# Patient Record
Sex: FEMALE | Race: WHITE | NOT HISPANIC OR LATINO | Employment: UNEMPLOYED | URBAN - METROPOLITAN AREA
[De-identification: names, ages, dates, MRNs, and addresses within clinical notes are randomized per-mention and may not be internally consistent; named-entity substitution may affect disease eponyms.]

---

## 2017-01-24 ENCOUNTER — LAB VISIT (OUTPATIENT)
Dept: LAB | Facility: HOSPITAL | Age: 37
End: 2017-01-24
Attending: INTERNAL MEDICINE
Payer: COMMERCIAL

## 2017-01-24 ENCOUNTER — OFFICE VISIT (OUTPATIENT)
Dept: CARDIOLOGY | Facility: CLINIC | Age: 37
End: 2017-01-24
Payer: COMMERCIAL

## 2017-01-24 VITALS
SYSTOLIC BLOOD PRESSURE: 120 MMHG | HEIGHT: 65 IN | DIASTOLIC BLOOD PRESSURE: 83 MMHG | HEART RATE: 69 BPM | WEIGHT: 168 LBS | BODY MASS INDEX: 27.99 KG/M2

## 2017-01-24 DIAGNOSIS — R07.89 CHEST TIGHTNESS: ICD-10-CM

## 2017-01-24 DIAGNOSIS — R94.31 ABNORMAL RESTING ECG FINDINGS: ICD-10-CM

## 2017-01-24 DIAGNOSIS — R00.2 PALPITATIONS: ICD-10-CM

## 2017-01-24 LAB — TSH SERPL DL<=0.005 MIU/L-ACNC: 0.76 UIU/ML

## 2017-01-24 PROCEDURE — 99204 OFFICE O/P NEW MOD 45 MIN: CPT | Mod: S$GLB,,, | Performed by: INTERNAL MEDICINE

## 2017-01-24 PROCEDURE — 84443 ASSAY THYROID STIM HORMONE: CPT

## 2017-01-24 PROCEDURE — 1159F MED LIST DOCD IN RCRD: CPT | Mod: S$GLB,,, | Performed by: INTERNAL MEDICINE

## 2017-01-24 PROCEDURE — 99999 PR PBB SHADOW E&M-NEW PATIENT-LVL III: CPT | Mod: PBBFAC,,, | Performed by: INTERNAL MEDICINE

## 2017-01-24 PROCEDURE — 36415 COLL VENOUS BLD VENIPUNCTURE: CPT | Mod: PO

## 2017-01-24 NOTE — MR AVS SNAPSHOT
"    [facility]  [address] [facility] [address]  [place] [address]  Phone: [phone]                  Niesha Victoria   2017 3:00 PM   Office Visit    Description:  Female : 1980   Provider:  Kirt Whitehead MD   Department:  [facility]           Reason for Visit     Chest Pain           Diagnoses this Visit        Comments    Chest tightness         Palpitations         Abnormal resting ECG findings                To Do List           Goals (5 Years of Data)     None      Follow-Up and Disposition     Call patient with results    Follow-up and Disposition History      [facility]     [facility] Nurse Care Line -  Assistance  Registered nurses in the [facility] provide clinical advisement, health education, appointment booking, and other advisory services.  Call for this free service at [phone].             Medications           Message regarding Medications     Verify the changes and/or additions to your medication regime listed below are the same as discussed with your clinician today.  If any of these changes or additions are incorrect, please notify your healthcare provider.        STOP taking these medications     doxycycline (VIBRAMYCIN) 50 MG capsule Take 75 mg by mouth once daily.            Verify that the below list of medications is an accurate representation of the medications you are currently taking.  If none reported, the list may be blank. If incorrect, please contact your healthcare provider. Carry this list with you in case of emergency.           Current Medications     dapsone 100 MG Tab Take 25 mg by mouth every other day.           Clinical Reference Information           Your Vitals Were     BP Pulse Height Weight BMI    120/83 (BP Location: Right arm, Patient Position: Sitting, BP Method: Automatic) 69 5' 5" (1.651 m) 76.2 kg (167 lb 15.9 oz) 27.96 kg/m2      Blood Pressure          Most Recent Value    BP  120/83      Allergies " as of 1/24/2017     No Known Allergies      Immunizations Administered on Date of Encounter - 1/24/2017     None      Orders Placed During Today's Visit     Future Labs/Procedures Expected by Expires    TSH  1/24/2017 3/25/2018    Exercise stress echo  As directed 1/24/2018      Language Assistance Services     ATTENTION: Language assistance services are available, free of charge. Please call [phone].      ATENCIÓN: Si habla español, tiene a kunz disposición servicios gratuitos de asistencia lingüística. Llame al [phone].     CHÚ Ý: N?u b?n nói Ti?ng Vi?t, có các d?ch v? h? tr? ngôn ng? mi?n phí dành cho b?n. G?i s? [phone].         [facility] complies with applicable Federal civil rights laws and does not discriminate on the basis of race, color, national origin, age, disability, or sex.

## 2017-01-24 NOTE — PROGRESS NOTES
Subjective:    Patient ID:  Niesha Victoria is a 36 y.o. female who presents for evaluation of Chest Pain (chest tightness since Sunday (painscale 5/10) - Went to [facility] ER 01/23/16)      HPI Comments: Pt with no chronic medical problems began feeling a bit jittery, anxious and developed a tightness in her chest which has persisted for 2 days. She feels like her heart is racing but BP and pulse are normal. She was seen in the ER yesterday and other than some non-specific ECG findings w/u was normal. She reports no exertional component to the symptoms and denies dyspnea. She has not been able to sleep and is fearful os a major heart problem.       Review of Systems   Constitution: Negative for weight gain and weight loss.   HENT: Negative.    Eyes: Negative.    Cardiovascular: Positive for chest pain and palpitations. Negative for claudication, cyanosis, dyspnea on exertion, irregular heartbeat, leg swelling, near-syncope, orthopnea (no PND) and syncope.   Respiratory: Negative for cough, hemoptysis, shortness of breath and snoring.    Endocrine: Negative.    Skin: Negative.    Musculoskeletal: Negative for joint pain, muscle cramps, muscle weakness and myalgias.   Gastrointestinal: Negative for diarrhea, hematemesis, nausea and vomiting.   Genitourinary: Negative.    Neurological: Negative for dizziness, focal weakness, light-headedness, loss of balance, numbness, paresthesias and seizures.   Psychiatric/Behavioral: The patient has insomnia and is nervous/anxious.         Objective:    Physical Exam   Constitutional: She is oriented to person, place, and time. She appears well-developed and well-nourished.   Eyes: Pupils are equal, round, and reactive to light.   Neck: Normal range of motion. No thyromegaly present.   Cardiovascular: Normal rate, regular rhythm, S1 normal, S2 normal, normal heart sounds, intact distal pulses and normal pulses.   No extrasystoles are present. PMI is not displaced.  Exam reveals no  friction rub.    No murmur heard.  Pulmonary/Chest: Effort normal and breath sounds normal. She has no wheezes. She has no rales. She exhibits no tenderness.   Abdominal: Soft. Bowel sounds are normal. She exhibits no distension and no mass. There is no tenderness.   Musculoskeletal: Normal range of motion. She exhibits no edema.   Neurological: She is alert and oriented to person, place, and time.   Skin: Skin is warm and dry.   Vitals reviewed.      Test(s) Reviewed  I have reviewed the following in detail:  [] Stress test   [] Angiography   [] Echocardiogram   [x] Labs   [x] Other:  ECG, CXR       Assessment:       1. Chest tightness    2. Palpitations    3. Abnormal resting ECG findings         Plan:       We discussed her symptoms and concerns  Will do exercise stress echo - if normal she will f/u with PCP  TSH

## 2017-01-24 NOTE — LETTER
January 24, 2017      [facility]  [address] [facility] [address]           [facility]  [address] [facility] [address] [place] [address]  Phone: [phone]          Patient: Niesha Victoria   MR Number: [id number]   YOB: 1980   Date of Visit: 1/24/2017       Dear [facility]:    Thank you for referring Niesha Victoria to me for evaluation. Attached you will find relevant portions of my assessment and plan of care.    If you have questions, please do not hesitate to call me. I look forward to following Niesha Victoria along with you.    Sincerely,    Kirt Whitehead MD    Enclosure  CC:  No Recipients    If you would like to receive this communication electronically, please contact [email or web address] or [phone] to request more information on [employer] Link access.    For providers and/or their staff who would like to refer a patient to [facility], please contact us through our one-stop-shop provider referral line, Lewis Lyons, at [phone].    If you feel you have received this communication in error or would no longer like to receive these types of communications, please e-mail [email or web address]

## 2017-03-08 ENCOUNTER — OFFICE VISIT (OUTPATIENT)
Dept: CARDIOLOGY | Facility: CLINIC | Age: 37
End: 2017-03-08
Payer: COMMERCIAL

## 2017-03-08 VITALS
HEART RATE: 72 BPM | DIASTOLIC BLOOD PRESSURE: 71 MMHG | HEIGHT: 65 IN | WEIGHT: 159.38 LBS | SYSTOLIC BLOOD PRESSURE: 112 MMHG | BODY MASS INDEX: 26.55 KG/M2

## 2017-03-08 DIAGNOSIS — F41.9 ANXIETY: ICD-10-CM

## 2017-03-08 DIAGNOSIS — R07.9 CHEST PAIN, UNSPECIFIED TYPE: ICD-10-CM

## 2017-03-08 DIAGNOSIS — R00.2 PALPITATIONS: ICD-10-CM

## 2017-03-08 PROCEDURE — 1160F RVW MEDS BY RX/DR IN RCRD: CPT | Mod: S$GLB,,, | Performed by: INTERNAL MEDICINE

## 2017-03-08 PROCEDURE — 99999 PR PBB SHADOW E&M-EST. PATIENT-LVL III: CPT | Mod: PBBFAC,,, | Performed by: INTERNAL MEDICINE

## 2017-03-08 PROCEDURE — 99214 OFFICE O/P EST MOD 30 MIN: CPT | Mod: S$GLB,,, | Performed by: INTERNAL MEDICINE

## 2017-03-08 RX ORDER — DESONIDE 0.5 MG/G
1 AEROSOL, FOAM TOPICAL
Refills: 4 | COMMUNITY
Start: 2017-02-14 | End: 2020-08-12

## 2017-03-08 RX ORDER — ALPRAZOLAM 0.5 MG/1
0.5 TABLET ORAL DAILY PRN
Refills: 0 | COMMUNITY
Start: 2017-01-25 | End: 2020-08-12

## 2017-03-08 RX ORDER — TEMAZEPAM 15 MG/1
15 CAPSULE ORAL NIGHTLY PRN
Refills: 0 | COMMUNITY
Start: 2017-02-15 | End: 2020-08-12

## 2017-03-08 NOTE — MR AVS SNAPSHOT
[facility]  [address]  [place] [address]  Phone: [phone]                  Niesha Victoria   3/8/2017 9:40 AM   Office Visit    Description:  Female : 1980   Provider:  Kirt Whitehead MD   Department:  [facility]           Reason for Visit     Follow-up     Palpitations     Results           Diagnoses this Visit        Comments    Palpitations         Anxiety         Chest pain, unspecified type                To Do List           Future Appointments        Provider Department Dept Phone    3/13/2017 8:30 AM EKG, [facility] [phone]    3/15/2017 9:15 AM ECHO, [facility] [phone]      Goals (5 Years of Data)     None      Follow-Up and Disposition     Call patient with results      [facility]     [facility] On Call Nurse Care Line -  Assistance  Registered nurses in the [facility] provide clinical advisement, health education, appointment booking, and other advisory services.  Call for this free service at [phone].             Medications           Message regarding Medications     Verify the changes and/or additions to your medication regime listed below are the same as discussed with your clinician today.  If any of these changes or additions are incorrect, please notify your healthcare provider.        STOP taking these medications     dapsone 100 MG Tab Take 25 mg by mouth every other day.           Verify that the below list of medications is an accurate representation of the medications you are currently taking.  If none reported, the list may be blank. If incorrect, please contact your healthcare provider. Carry this list with you in case of emergency.           Current Medications     alprazolam (XANAX) 0.5 MG tablet Take 0.5 mg by mouth daily as needed.    OMEGA-3 FATTY ACIDS/FISH OIL (OMEGA 3 FISH OIL ORAL) Take 2 capsules by mouth once daily.    temazepam (RESTORIL) 15 mg Cap  "Take 15 mg by mouth nightly as needed.    VERDESO 0.05 % foam Apply 1 application topically 3 (three) times a week.           Clinical Reference Information           Your Vitals Were     BP Pulse Height Weight Last Period BMI    112/71 (BP Location: Right arm, Patient Position: Sitting, BP Method: Automatic) 72 5' 5" (1.651 m) 72.3 kg (159 lb 6.3 oz) 02/12/2017 (Approximate) 26.52 kg/m2      Blood Pressure          Most Recent Value    BP  112/71      Allergies as of 3/8/2017     No Known Allergies      Immunizations Administered on Date of Encounter - 3/8/2017     None      Orders Placed During Today's Visit     Future Labs/Procedures Expected by Expires    Exercise stress echo  As directed 3/8/2018    Holter monitor - 48 hour  As directed 3/8/2018      Language Assistance Services     ATTENTION: Language assistance services are available, free of charge. Please call [phone].      ATENCIÓN: Si habla español, tiene a kunz disposición servicios gratuitos de asistencia lingüística. Llame al [phone].     CHÚ Ý: N?u b?n nói Ti?ng Vi?t, có các d?ch v? h? tr? ngôn ng? mi?n phí dành cho b?n. G?i s? [phone].         [facility] complies with applicable Federal civil rights laws and does not discriminate on the basis of race, color, national origin, age, disability, or sex.        "

## 2017-03-08 NOTE — PROGRESS NOTES
Subjective:    Patient ID:  Niesha Victoria is a 36 y.o. female who presents for follow-up of Follow-up; Palpitations; and Results      HPI Comments: Pt seen in January here with continued palpitations and chest pains. He was scheduled for a stress echo but cancelled due to high deductible.        Review of Systems   Constitution: Negative for weight gain and weight loss.   HENT: Negative.    Eyes: Negative.    Cardiovascular: Positive for chest pain, irregular heartbeat and palpitations. Negative for claudication, cyanosis, dyspnea on exertion, leg swelling, near-syncope, orthopnea (no PND) and syncope.   Respiratory: Negative for cough, hemoptysis, shortness of breath and snoring.    Endocrine: Negative.    Skin: Negative.    Musculoskeletal: Negative for joint pain, muscle cramps, muscle weakness and myalgias.   Gastrointestinal: Negative for diarrhea, hematemesis, nausea and vomiting.   Genitourinary: Negative.    Neurological: Negative for dizziness, focal weakness, light-headedness, loss of balance, numbness, paresthesias and seizures.   Psychiatric/Behavioral: The patient is nervous/anxious.         Objective:    Physical Exam   Constitutional: She is oriented to person, place, and time. She appears well-developed and well-nourished.   Eyes: Pupils are equal, round, and reactive to light.   Neck: Normal range of motion. No thyromegaly present.   Cardiovascular: Normal rate, regular rhythm, S1 normal, S2 normal, normal heart sounds, intact distal pulses and normal pulses.   No extrasystoles are present. PMI is not displaced.  Exam reveals no friction rub.    No murmur heard.  Pulmonary/Chest: Effort normal and breath sounds normal. She has no wheezes. She has no rales. She exhibits no tenderness.   Abdominal: Soft. Bowel sounds are normal. She exhibits no distension and no mass. There is no tenderness.   Musculoskeletal: Normal range of motion. She exhibits no edema.   Neurological: She is alert and oriented to  person, place, and time.   Skin: Skin is warm and dry.   Vitals reviewed.        Assessment:       1. Palpitations    2. Anxiety    3. Chest pain, unspecified type         Plan:       We discussed her continued symptoms and she is willing to pursue w/u.  Holter   Exercise stress echo

## 2017-03-13 ENCOUNTER — CLINICAL SUPPORT (OUTPATIENT)
Dept: CARDIOLOGY | Facility: CLINIC | Age: 37
End: 2017-03-13
Payer: COMMERCIAL

## 2017-03-13 DIAGNOSIS — F41.9 ANXIETY: ICD-10-CM

## 2017-03-13 DIAGNOSIS — R00.2 PALPITATIONS: ICD-10-CM

## 2017-03-13 DIAGNOSIS — R07.9 CHEST PAIN, UNSPECIFIED TYPE: ICD-10-CM

## 2017-03-13 PROCEDURE — 93224 XTRNL ECG REC UP TO 48 HRS: CPT | Mod: S$GLB,,, | Performed by: INTERNAL MEDICINE

## 2017-03-16 ENCOUNTER — PATIENT MESSAGE (OUTPATIENT)
Dept: CARDIOLOGY | Facility: CLINIC | Age: 37
End: 2017-03-16

## 2017-03-16 ENCOUNTER — CLINICAL SUPPORT (OUTPATIENT)
Dept: CARDIOLOGY | Facility: CLINIC | Age: 37
End: 2017-03-16
Payer: COMMERCIAL

## 2017-03-16 DIAGNOSIS — R07.9 CHEST PAIN, UNSPECIFIED TYPE: ICD-10-CM

## 2017-03-16 DIAGNOSIS — F41.9 ANXIETY: ICD-10-CM

## 2017-03-16 DIAGNOSIS — R00.2 PALPITATIONS: ICD-10-CM

## 2017-03-16 PROCEDURE — 93321 DOPPLER ECHO F-UP/LMTD STD: CPT | Mod: S$GLB,,, | Performed by: INTERNAL MEDICINE

## 2017-03-16 PROCEDURE — 93351 STRESS TTE COMPLETE: CPT | Mod: S$GLB,,, | Performed by: INTERNAL MEDICINE

## 2017-03-17 ENCOUNTER — PATIENT MESSAGE (OUTPATIENT)
Dept: CARDIOLOGY | Facility: CLINIC | Age: 37
End: 2017-03-17

## 2017-03-17 ENCOUNTER — TELEPHONE (OUTPATIENT)
Dept: CARDIOLOGY | Facility: CLINIC | Age: 37
End: 2017-03-17

## 2017-03-17 LAB
DIASTOLIC DYSFUNCTION: NO
RETIRED EF AND QEF - SEE NOTES: 55 (ref 55–65)

## 2017-03-17 NOTE — TELEPHONE ENCOUNTER
Test(s) Reviewed  I have reviewed the following in detail:  [x] Stress test   [] Angiography   [x] Echocardiogram   [x] Labs   [] Other:       Call Pt and tell her tests are ok - no significant arrhythmias  Occasional premature beats which seem to correlate to her symptoms.

## 2019-03-27 ENCOUNTER — OFFICE VISIT (OUTPATIENT)
Dept: FAMILY MEDICINE | Facility: HOSPITAL | Age: 39
End: 2019-03-27
Payer: COMMERCIAL

## 2019-03-27 DIAGNOSIS — F51.01 PRIMARY INSOMNIA: ICD-10-CM

## 2019-03-27 PROCEDURE — 99211 OFF/OP EST MAY X REQ PHY/QHP: CPT | Performed by: PSYCHOLOGIST

## 2019-03-27 NOTE — PROGRESS NOTES
"[facility]   [address] [place] [address]., [address]  [place] [address]    [facility]   Initial Note    Provider introduced herself to Patient and explained her role in the clinic (e.g., behavioral health services).  Descriptions of the assessment process, the initial 50-55 minute diagnostic appointment, and 20-30 minute follow-up appointments were given. Patient was fully cooperative throughout the interview and was an adequate historian. Patient willingly signed a consent form for treatment and limits of confidentiality were explained in this context.    Note: All information described in this report has been directly reported by the patient in the appointment (unless specifically noted) except for Mental Status, Diagnosis, and Conclusions/Recommendations/Initial Treatment Goals.      Date of Service: 3/27/2019   Patient Name:  Otf Victoria  Preferred Name: OTF  MRN: [id number]  : 1980  Age:  38 y.o.   CPT Code: 29781 - H&B Initial Assessment (2- 15-minute Units)     CPT Code: 82851 - H&B  Intervention (2- 15-minute Units)    Length of Session: 60 Minute  Present in Session: Patient  Provider: Sakina Lindsey, PhD,     Referral Source:  Patient was referred by her PCP (Dr. Ahn) for an assessment of insomnia.    Chief Complaint(s): "I worry if I'm going to sleep all the time."    Assessment of Chief Complaint(s):  Patient presented with  problems with sleep. She complained of dissatisfaction with sleep quantity and quality. She stated she has trouble initiating sleep, maintaining sleep, and early-morning waking. Patient endorsed worrying about if she is going to sleep throughout the day. She reported she is not able to function if she isn't able to sleep. She shared she feels fatigued, has trouble with concentration, and will stays in bed. She reported her family has noticed a change in her functioning, and her daughters ask her what's wrong with her. Patient stated her difficulty sleeping " "occurs every night, and has be going since the beginning of January 2019. Patient stated she isn't able to sleep despite adequate opportunity for sleep.     Patient denied having another sleep-wake disorder. She denied any substance use or abuse. Patient stated her worry is focused on her sleep. She denied current symptoms of panic attacks or generalized anxiety disorder. She denied current symptoms of depression.     Past treatment for referral problem:  She reported in January 2017 she developed scarring alopecia and had a panic attack. She stated she worried "all the time" about her body and health. She stated her worry about interfere with her sleep. Patient stated she recovered and her sleep returned to normal until she developed a cough in January 2019. She reported worrying about her health affected her sleep.     Patient reported she has met with two sleep doctors. She stated she has been prescribed Ambien for the past month; however, she doesn't take it as recommended (at bed time). She stated she will take the medication only if she's not able to fall asleep.    Psychotropic Medications:  Patient stated her PCP prescribed her Lexapro and Xanax approximately six weeks ago. Patient stated she doesn't take the Xanax often.     Current Mental Health Symptoms:   Psychotic symptoms: Denied.   Suicidal Ideation: Patient denied any current suicidal ideation, plan, means, or intent.   Homicidal Ideation: Patient denied any current homicidal ideation, plan, means, or intent.    Self-harming behaviors: Patient denied engaging in any current self-harming behaviors (e.g., cutting or burning).     Mental Health History:  Psychiatric History:  Psychiatric Hospitalizations: Denied.   Suicidal ideation/attempts: Denied.   Psychiatric Medications:Patient denied taking any other past psychiatric medications.   Family Psychiatric History: Endorsed.   Psychotherapy History:Denied.     Current Substance Use  Caffeine:  Patient " "denied any current caffeine use.  Tobacco:  Patient denied any current tobacco use.   Alcohol: Patient stated she currently drinks two days a week, and she will consume 2-4 alcoholic beverages per sitting.   Street Drugs:  Patient denied any current street drug use.     Intervention & Response:  Rapport building, assessment, and psychoeducation were utilized.  Patient was open to receiving psychoeducation about sleep management. Encouraged her to download the CBTi sasha. Discussed continuing to engage in physical activity (gym 5 days a week). Discussed increasing relaxation techniques (deep breathing and progressive muscle relaxation).    Handouts given:  Improving Sleep Through Behavior Change    Measures:  The Insomnia Severity Index: 22: Severe clinical insomnia (22-28).  PHQ-9: 0: Minimal depression (0-4).  HERBERTH-7: 6: Mild anxiety (6-10).    Interactive Complexity:  None.    Mental Status Exam:  Appearance: Patient was appropriately dressed for her session and had good hygiene.  Expressed Mood: Tired."  Affect: Normal range and intensity. Affect was consistent with expressed mood.  Attitude during Interview: Open and friendly.  Movement and Behavior: No unusual movements or psychomotor changes.  Speech: Normal rate/tone/volume, without pressure  Eye Contact: Makes eye contact  Thought processes: Clear, coherent, and organized.  Thought Content: No indication of suicidal ideation, homicidal ideation, hallucinations, delusions, obsessions, ideas of reference, or symptoms of dissociation.  Orientation: Oriented x 4 (person, place, time, and situation)  Memory/concentration: WNL   Good.    Diagnostic Impressions:  (780.52) (G47.00) Insomnia Disorder, Persistent   R/O Anxiety Disorder    Treatment Recommendations:  On the basis of the above information, the following recommendations are made with respect to treatment:    1. Brief cognitive and behavioral interventions related to sleep hygiene, stimulus control, sleep " restriction, and relaxation are also recommended to address sleep concerns.  2. It is recommended that Patient continue to meet with her primary care provider (Usaam Garg MD) or another psychiatric provider in order to manage psychotropic medications to address sleep-related anxiety concerns.  3. Patient is currently prescribed Lexapro, and she reported a history of health-related anxiety. Additional assessment is recommended to determine if she meet criteria for an anxiety disorder. A rule out of anxiety disorder is provided.     Need for Future Services:  Patient will return to the clinic in 2-4 weeks for an appointment with Provider.         Wiley,     Sakina Lindsey, PhD,   Licensed Psychologist

## 2021-05-10 ENCOUNTER — OFFICE VISIT (OUTPATIENT)
Dept: PSYCHIATRY | Facility: CLINIC | Age: 41
End: 2021-05-10
Payer: COMMERCIAL

## 2021-05-10 ENCOUNTER — PATIENT MESSAGE (OUTPATIENT)
Dept: RESEARCH | Facility: HOSPITAL | Age: 41
End: 2021-05-10

## 2021-05-10 DIAGNOSIS — F32.9 REACTIVE DEPRESSION: ICD-10-CM

## 2021-05-10 DIAGNOSIS — F41.9 ANXIETY: Primary | ICD-10-CM

## 2021-05-10 DIAGNOSIS — G47.00 INSOMNIA, UNSPECIFIED TYPE: ICD-10-CM

## 2021-05-10 PROCEDURE — 90792 PSYCH DIAG EVAL W/MED SRVCS: CPT | Mod: 95,,, | Performed by: NURSE PRACTITIONER

## 2021-05-10 PROCEDURE — 90792 PR PSYCHIATRIC DIAGNOSTIC EVALUATION W/MEDICAL SERVICES: ICD-10-PCS | Mod: 95,,, | Performed by: NURSE PRACTITIONER

## 2021-05-10 RX ORDER — BUSPIRONE HYDROCHLORIDE 7.5 MG/1
TABLET ORAL
Qty: 90 TABLET | Refills: 1 | Status: SHIPPED | OUTPATIENT
Start: 2021-05-10 | End: 2021-11-01

## 2021-05-11 ENCOUNTER — PATIENT MESSAGE (OUTPATIENT)
Dept: PSYCHIATRY | Facility: CLINIC | Age: 41
End: 2021-05-11

## 2021-06-30 ENCOUNTER — TELEPHONE (OUTPATIENT)
Dept: PSYCHIATRY | Facility: CLINIC | Age: 41
End: 2021-06-30

## 2021-07-13 ENCOUNTER — TELEPHONE (OUTPATIENT)
Dept: PSYCHIATRY | Facility: CLINIC | Age: 41
End: 2021-07-13

## 2021-08-05 ENCOUNTER — IMMUNIZATION (OUTPATIENT)
Dept: FAMILY MEDICINE | Facility: CLINIC | Age: 41
End: 2021-08-05
Payer: COMMERCIAL

## 2021-08-05 DIAGNOSIS — Z23 NEED FOR VACCINATION: Primary | ICD-10-CM

## 2021-08-05 PROCEDURE — 91300 COVID-19, MRNA, LNP-S, PF, 30 MCG/0.3 ML DOSE VACCINE: CPT | Mod: ,,, | Performed by: FAMILY MEDICINE

## 2021-08-05 PROCEDURE — 0001A COVID-19, MRNA, LNP-S, PF, 30 MCG/0.3 ML DOSE VACCINE: CPT | Mod: CV19,,, | Performed by: FAMILY MEDICINE

## 2021-08-05 PROCEDURE — 91300 COVID-19, MRNA, LNP-S, PF, 30 MCG/0.3 ML DOSE VACCINE: ICD-10-PCS | Mod: ,,, | Performed by: FAMILY MEDICINE

## 2021-08-05 PROCEDURE — 0001A COVID-19, MRNA, LNP-S, PF, 30 MCG/0.3 ML DOSE VACCINE: ICD-10-PCS | Mod: CV19,,, | Performed by: FAMILY MEDICINE

## 2021-09-10 ENCOUNTER — IMMUNIZATION (OUTPATIENT)
Dept: FAMILY MEDICINE | Facility: CLINIC | Age: 41
End: 2021-09-10
Payer: COMMERCIAL

## 2021-09-10 DIAGNOSIS — Z23 NEED FOR VACCINATION: Primary | ICD-10-CM

## 2021-09-10 PROCEDURE — 0002A COVID-19, MRNA, LNP-S, PF, 30 MCG/0.3 ML DOSE VACCINE: CPT | Mod: CV19,,, | Performed by: FAMILY MEDICINE

## 2021-09-10 PROCEDURE — 91300 COVID-19, MRNA, LNP-S, PF, 30 MCG/0.3 ML DOSE VACCINE: ICD-10-PCS | Mod: ,,, | Performed by: FAMILY MEDICINE

## 2021-09-10 PROCEDURE — 0002A COVID-19, MRNA, LNP-S, PF, 30 MCG/0.3 ML DOSE VACCINE: ICD-10-PCS | Mod: CV19,,, | Performed by: FAMILY MEDICINE

## 2021-09-10 PROCEDURE — 91300 COVID-19, MRNA, LNP-S, PF, 30 MCG/0.3 ML DOSE VACCINE: CPT | Mod: ,,, | Performed by: FAMILY MEDICINE

## 2021-11-01 PROBLEM — F33.9 DEPRESSION, RECURRENT: Status: ACTIVE | Noted: 2021-11-01
